# Patient Record
Sex: MALE | Race: WHITE | Employment: OTHER | URBAN - METROPOLITAN AREA
[De-identification: names, ages, dates, MRNs, and addresses within clinical notes are randomized per-mention and may not be internally consistent; named-entity substitution may affect disease eponyms.]

---

## 2019-03-18 ENCOUNTER — OFFICE VISIT (OUTPATIENT)
Dept: PODIATRY | Facility: CLINIC | Age: 29
End: 2019-03-18
Payer: COMMERCIAL

## 2019-03-18 VITALS
HEIGHT: 72 IN | DIASTOLIC BLOOD PRESSURE: 82 MMHG | SYSTOLIC BLOOD PRESSURE: 116 MMHG | WEIGHT: 176 LBS | BODY MASS INDEX: 23.84 KG/M2

## 2019-03-18 DIAGNOSIS — S99.921A TOE INJURY, RIGHT, INITIAL ENCOUNTER: Primary | ICD-10-CM

## 2019-03-18 DIAGNOSIS — L60.8 CHANGE IN NAIL APPEARANCE: ICD-10-CM

## 2019-03-18 PROCEDURE — 99203 OFFICE O/P NEW LOW 30 MIN: CPT | Performed by: PODIATRIST

## 2019-03-18 ASSESSMENT — MIFFLIN-ST. JEOR: SCORE: 1801.33

## 2019-03-18 NOTE — LETTER
"    3/18/2019         RE: Taras Mendoza  [address] [place] [address]        Dear Colleague,    Thank you for referring your patient, Taras Mendoza, to the [facility]. Please see a copy of my visit note below.      ASSESSMENT/PLAN:    Encounter Diagnoses   Name Primary?     Toe injury, right, initial encounter Yes     Change in nail appearance      No acute findings, regarding his right 3rd toe. I assured him that it will likely be fine, but foot/ toe injuries can take awhile to resolve, due to weight bearing.  I suggested he try stiffer soled shoes to splint/ offload the toe.     No acute findings, regarding his left hallux nail. Some incurvation, yet no pain.  I discussed that he should return to clinic if pain develops.  We reviewed the clinical signs of infection. No indication for a nail procedure at this time.      Body mass index is 23.87 kg/m .      Yash Linares DPM, FACFAS, MS    [facility] of Podiatry/Foot & Ankle Surgery      ____________________________________________________________________    HPI:         Chief Complaint: concern for possible right 3rd toe injury  Onset of problem: 1 week. He had slipped his forefoot underneath a secured piece of equipment to do sit ups.    Pain/ discomfort is described as:  Ache and tingling in the toe  Rating:  3/10 at worst   Frequency:  daily    The pain is made worse with walking with direct pressure on the toe - \"it feels different.\"  Previous treatment: none    Secondary concerns:  He also reports a history of left hallux nail being ingrown and a nail procedure. Based on its appearance, he is concerned about the nail becoming a problem again.    *There is no problem list on file for this patient.  *  *No past surgical history on file.*  *  Current Outpatient Medications   Medication Sig Dispense Refill     methylPREDNISolone (MEDROL DOSEPAK) 4 MG tablet Follow package instructions (Patient not taking: Reported on " 3/18/2019) 21 tablet 0     triamcinolone (KENALOG) 0.1 % cream Apply topically 2 times daily (Patient not taking: Reported on 3/18/2019) 60 g 1       ROS:     A 10-point review of systems was performed and is positive for that noted above in the HPI and as seen below.  All other areas are negative.     Numbness in feet?  yes   Calf pain with walking? no  Recent foot/ankle injury? Ingrown toenail  Weight change over past 12 months? 5# gain  Self perception as overweight? no  Recent flu-like symptoms? no  Joint pain other than feet ? back    Social History: Employment:  no;  Exercise/Physical activity:  running;  Tobacco use:  no  Social History     Socioeconomic History     Marital status: Single     Spouse name: Not on file     Number of children: Not on file     Years of education: Not on file     Highest education level: Not on file   Occupational History     Not on file   Social Needs     Financial resource strain: Not on file     Food insecurity:     Worry: Not on file     Inability: Not on file     Transportation needs:     Medical: Not on file     Non-medical: Not on file   Tobacco Use     Smoking status: Never Smoker     Smokeless tobacco: Never Used   Substance and Sexual Activity     Alcohol use: Not on file     Drug use: Not on file     Sexual activity: Not on file   Lifestyle     Physical activity:     Days per week: Not on file     Minutes per session: Not on file     Stress: Not on file   Relationships     Social connections:     Talks on phone: Not on file     Gets together: Not on file     Attends Zoroastrianism service: Not on file     Active member of club or organization: Not on file     Attends meetings of clubs or organizations: Not on file     Relationship status: Not on file     Intimate partner violence:     Fear of current or ex partner: Not on file     Emotionally abused: Not on file     Physically abused: Not on file     Forced sexual activity: Not on file   Other Topics Concern     Not on file  "  Social History Narrative     Not on file       Family history:  No family history on file.    Rheumatoid arthritis:  grandparent  Foot Problems: no  Diabetes: no      EXAM:    Vitals: /82   Ht 1.829 m (6')   Wt 79.8 kg (176 lb)   BMI 23.87 kg/m     BMI: Body mass index is 23.87 kg/m .  Height: 6' 0\"    Constitutional/ general:  Pt is in no apparent distress, appears well-nourished.  Cooperative with history and physical exam.     Vascular:  Pedal pulses are palpable bilaterally for both the DP and PT arteries.  CFT < 3 sec.  No edema.  Pedal hair growth noted.     Neuro:  Alert and oriented x 3. Coordinated gait.  Light touch sensation is intact to the L4, L5, S1 distributions. No obvious deficits.  No evidence of neurological-based weakness, spasticity, or contracture in the lower extremities.     Derm: Normal texture and turgor.  No erythema, ecchymosis, or cyanosis.  No open lesions. The left hallux nail plate has some incurvation along the medial edge. No redness, swelling, pain.     Musculoskeletal:    Lower extremity muscle strength is normal.  Patient is ambulatory without an assistive device or brace .  No gross deformities.  Palpatory exam of the right 3rd toe did not reveal pain or abnormality.  This toe has no deformity, edema, erythema, skin injury.    Yash Linares DPM, FACFAS, MS    [facility] Podiatry/Foot & Ankle Surgery                Again, thank you for allowing me to participate in the care of your patient.        Sincerely,        Yash Linares DPM    "

## 2019-03-18 NOTE — PATIENT INSTRUCTIONS
Try wearing stiffer soled shoes until the right 3rd toe feels better.   If it hurts and/or swells, cold application might help.     - What is an ingrown toenail? An ingrown toenail is a medical condition usually caused by a nail edge irritating the neighboring soft tissue and skin. It can cause pain and lead to infection.  - What causes ingrown toenails? There are likely multiple causes of ingrown toenails, including nail shape and width, narrow shoes, a person s activity level, and likely family history of nail problems.  - Risks of not treating condition: If left untreated, some people endure ongoing tenderness and pain. The biggest concern is infection from bacteria entering through the damage soft tissue.  - How is it treated? Some people achieve relief by soaking their feet and trimming the edge of the nail. If an infection has developed, then an oral antibiotic can be prescribed, but the condition often returns after the course of the medication is completed. Often self treatment is not successful and treatment by a qualified medical provider is needed. This often involves numbing the toe with a local anesthetic and then either removing the edge of a nail or the entire nail.  - Risks of surgery? As with any form of surgery, infections is a risk. However, a person is probably at greater risk for infection if the ingrown toenail is left untreated. Nail removal is a common, simple, and fairly quick procedure that in most cases is done in the physician's office. If an infection exists, often the only treatment that is successful is removal.      SIGNS OF INFECTION  expanding redness around the wound   yellow or greenish-colored pus or cloudy wound drainage   red streaking spreading from the wound   increased swelling, tenderness, or pain around the wound   fever  *If you notice any of these signs of infection, call us right away!